# Patient Record
Sex: MALE | Race: WHITE | Employment: FULL TIME | URBAN - METROPOLITAN AREA
[De-identification: names, ages, dates, MRNs, and addresses within clinical notes are randomized per-mention and may not be internally consistent; named-entity substitution may affect disease eponyms.]

---

## 2017-01-22 ENCOUNTER — OFFICE VISIT (OUTPATIENT)
Dept: FAMILY MEDICINE CLINIC | Facility: CLINIC | Age: 22
End: 2017-01-22

## 2017-01-22 DIAGNOSIS — L20.9 MILD ATOPIC DERMATITIS: Primary | ICD-10-CM

## 2017-01-22 PROCEDURE — 99203 OFFICE O/P NEW LOW 30 MIN: CPT | Performed by: NURSE PRACTITIONER

## 2017-01-22 RX ORDER — METHYLPREDNISOLONE 4 MG/1
TABLET ORAL
Qty: 1 KIT | Refills: 0 | Status: SHIPPED | OUTPATIENT
Start: 2017-01-22 | End: 2017-02-13

## 2017-01-22 NOTE — PATIENT INSTRUCTIONS
Atopic Dermatitis (Adult)  Atopic dermatitis is a dry, itchy, red rash. It’s also called eczema. The rash is chronic, or ongoing. It can come and go over time. The disease is often passed down in families.  It causes a problem with the skin barrier that m See your healthcare provider, or as advised. If your symptoms don’t get better or if they get worse in the next 7 days, make an appointment with your healthcare provider.   When to seek medical advice  Call your healthcare provider right away  if any of the

## 2017-01-23 VITALS
HEART RATE: 61 BPM | RESPIRATION RATE: 18 BRPM | SYSTOLIC BLOOD PRESSURE: 118 MMHG | DIASTOLIC BLOOD PRESSURE: 66 MMHG | WEIGHT: 201 LBS | OXYGEN SATURATION: 99 % | TEMPERATURE: 98 F | BODY MASS INDEX: 25 KG/M2

## 2017-01-23 NOTE — PROGRESS NOTES
CHIEF COMPLAINT:   Patient presents with:  Rash: rash on torso         HPI:   Vivi Chatman is a 19year old male who presents for evaluation of a rash. Per patient rash started in the past 3 days. Rash has been worsening since onset.   Patient has LUNGS: Denies shortness of breath with exertion or rest. No cough or wheezing. LYMPH: Denies enlargement of the lymph nodes. NEURO: Denies abnormal sensation, tingling of the skin, or numbness.       EXAM:   /66 mmHg  Pulse 61  Temp(Src) 98 °F (36.7 PLAN: Increase Fluids, Meds as listed below. Comfort measures as described in Patient Instructions. Skin care discussed with patient.      Meds & Refills for this Visit:    Signed Prescriptions Disp Refills    methylPREDNISolone (MEDROL) 4 MG Oral Tablet · Use oral diphenhydramine to help reduce itching. This is an antihistamine you can buy at drug and grocery stores. It can make you sleepy, so use lower doses during the daytime. Or you can use loratadine.  This is an antihistamine that will not make you sl

## 2017-02-02 ENCOUNTER — TELEPHONE (OUTPATIENT)
Dept: DERMATOLOGY CLINIC | Facility: CLINIC | Age: 22
End: 2017-02-02

## 2017-02-02 DIAGNOSIS — L30.9 DERMATITIS: Primary | ICD-10-CM

## 2017-02-02 NOTE — TELEPHONE ENCOUNTER
Pt contacted, states that he was treated by PCP on 1/21/2017 for dermatitis to leg that is not improving. Appt made for pt, referral request put through to Dr. Artem Childs as pt has referral but not for dermatitis.

## 2017-02-13 ENCOUNTER — OFFICE VISIT (OUTPATIENT)
Dept: DERMATOLOGY CLINIC | Facility: CLINIC | Age: 22
End: 2017-02-13

## 2017-02-13 DIAGNOSIS — B00.9 HERPES SIMPLEX VIRUS (HSV) INFECTION: ICD-10-CM

## 2017-02-13 DIAGNOSIS — L30.9 DERMATITIS: Primary | ICD-10-CM

## 2017-02-13 DIAGNOSIS — L50.9 URTICARIA: ICD-10-CM

## 2017-02-13 PROCEDURE — 99212 OFFICE O/P EST SF 10 MIN: CPT | Performed by: DERMATOLOGY

## 2017-02-13 PROCEDURE — 99213 OFFICE O/P EST LOW 20 MIN: CPT | Performed by: DERMATOLOGY

## 2017-02-13 RX ORDER — MONTELUKAST SODIUM 10 MG/1
10 TABLET ORAL NIGHTLY
Qty: 30 TABLET | Refills: 11 | Status: SHIPPED | OUTPATIENT
Start: 2017-02-13 | End: 2017-03-15 | Stop reason: ALTCHOICE

## 2017-02-13 RX ORDER — FAMCICLOVIR 500 MG/1
TABLET, FILM COATED ORAL
Refills: 12 | COMMUNITY
Start: 2017-01-11 | End: 2018-02-06 | Stop reason: ALTCHOICE

## 2017-02-13 RX ORDER — METHYLPREDNISOLONE 4 MG/1
TABLET ORAL
Qty: 1 KIT | Refills: 2 | Status: SHIPPED | OUTPATIENT
Start: 2017-02-13 | End: 2017-03-27 | Stop reason: ALTCHOICE

## 2017-02-15 NOTE — PROGRESS NOTES
Estefania Chang is a 19year old male. Patient presents with:  Rash: LOV 2/5/2016. Pt presenting with rash to bilateral legs x 2 months. Currently using hydrocortisone cream.  Hives: Pt states previously had hives to face and abdomen x 1-2 weeks. External Cream Apply 1 Application topically 2 (two) times daily. Apply every morning and evening.  Disp: 30 g Rfl: 3   acyclovir (ZOVIRAX) 400 MG Oral Tab TAKE 1 TABLET DAILY Disp: 30 tablet Rfl: 0   Adapalene (DIFFERIN) 0.1 % External Gel Use qhs for acne variation with this. Becomes exceedingly uncomfortable. Presently improving. Patient notes that HSV has been recently well controlled on Famvir will continue this  Patient presents with concerns above. Denies any other systemic complaints.   No fevers lesions  RTC as noted needed call with update trial Singulair and topicals    No orders of the defined types were placed in this encounter.        Meds & Refills for this Visit:   Signed Prescriptions Disp Refills    triamcinolone acetonide 0.1 % External C

## 2017-03-15 ENCOUNTER — OFFICE VISIT (OUTPATIENT)
Dept: FAMILY MEDICINE CLINIC | Facility: CLINIC | Age: 22
End: 2017-03-15

## 2017-03-15 VITALS
SYSTOLIC BLOOD PRESSURE: 144 MMHG | BODY MASS INDEX: 26 KG/M2 | DIASTOLIC BLOOD PRESSURE: 64 MMHG | WEIGHT: 208.81 LBS | HEART RATE: 45 BPM

## 2017-03-15 DIAGNOSIS — M25.552 LEFT HIP PAIN: Primary | ICD-10-CM

## 2017-03-15 PROCEDURE — 99212 OFFICE O/P EST SF 10 MIN: CPT | Performed by: FAMILY MEDICINE

## 2017-03-15 PROCEDURE — 99213 OFFICE O/P EST LOW 20 MIN: CPT | Performed by: FAMILY MEDICINE

## 2017-03-15 NOTE — PROGRESS NOTES
Blood pressure 144/64, pulse 45, weight 208 lb 12.8 oz (94.711 kg). Patient presents today complaining of left groin pain that is been bothering him for the past 2 weeks. It initially began when he was bending over to dress.   He reports it was very d

## 2017-03-27 ENCOUNTER — OFFICE VISIT (OUTPATIENT)
Dept: ORTHOPEDICS CLINIC | Facility: CLINIC | Age: 22
End: 2017-03-27

## 2017-03-27 ENCOUNTER — HOSPITAL ENCOUNTER (OUTPATIENT)
Dept: GENERAL RADIOLOGY | Facility: HOSPITAL | Age: 22
Discharge: HOME | End: 2017-03-27
Attending: ORTHOPAEDIC SURGERY
Payer: COMMERCIAL

## 2017-03-27 DIAGNOSIS — M25.552 LEFT HIP PAIN: Primary | ICD-10-CM

## 2017-03-27 DIAGNOSIS — M25.552 LEFT HIP PAIN: ICD-10-CM

## 2017-03-27 PROCEDURE — 73501 X-RAY EXAM HIP UNI 1 VIEW: CPT

## 2017-03-27 PROCEDURE — 99214 OFFICE O/P EST MOD 30 MIN: CPT | Performed by: ORTHOPAEDIC SURGERY

## 2017-03-27 PROCEDURE — 99212 OFFICE O/P EST SF 10 MIN: CPT | Performed by: ORTHOPAEDIC SURGERY

## 2017-03-27 NOTE — PROGRESS NOTES
Is a healthy active 18-year-old male who is describing left hip-groin pain that began 3-4 weeks ago.   Getting up from a flexed position will cause a sudden groin pain that is bothersome with any ambulation and he manipulates his hip he feels a popping sens unremarkable of any obvious hip arthritic change or narrowing I do not see obvious bone lesion    Impression left hip pain -- plan-he has been assessed for an inguinal hernia and stated that he does not have one by his primary care I am concerned about pos

## 2017-03-30 ENCOUNTER — HOSPITAL ENCOUNTER (OUTPATIENT)
Dept: MRI IMAGING | Facility: HOSPITAL | Age: 22
Discharge: HOME | End: 2017-03-30
Attending: ORTHOPAEDIC SURGERY
Payer: COMMERCIAL

## 2017-03-30 DIAGNOSIS — M25.552 LEFT HIP PAIN: ICD-10-CM

## 2017-03-30 PROCEDURE — 73721 MRI JNT OF LWR EXTRE W/O DYE: CPT

## 2017-04-05 ENCOUNTER — OFFICE VISIT (OUTPATIENT)
Dept: ORTHOPEDICS CLINIC | Facility: CLINIC | Age: 22
End: 2017-04-05

## 2017-04-05 DIAGNOSIS — M25.552 LEFT HIP PAIN: Primary | ICD-10-CM

## 2017-04-05 PROCEDURE — 99213 OFFICE O/P EST LOW 20 MIN: CPT | Performed by: ORTHOPAEDIC SURGERY

## 2017-04-05 PROCEDURE — 99212 OFFICE O/P EST SF 10 MIN: CPT | Performed by: ORTHOPAEDIC SURGERY

## 2017-04-05 NOTE — H&P
Chief Complaint: Left hip pain    NURSING INTAKE COMMENTS: Patient presents with:  Hip Pain: Left- pt states pain started 4 wks ago. pt denies any injury. pt saw Dev Yarsanism and he ordered MRI- here to discuss results      History of present illness:   This 21 year Hamstrings 5/5 5/5      Quad 5/5 5/5      Abdominals 5/5 5/5        Pain         Flexion, adduction, IR none none       JANENE none none       Log Roll none none       Palpation of troch bursa none none       Palpation of hip flexors none none        N

## 2017-04-11 RX ORDER — FAMCICLOVIR 500 MG/1
TABLET, FILM COATED ORAL
Qty: 60 TABLET | Refills: 12 | Status: SHIPPED | OUTPATIENT
Start: 2017-04-11 | End: 2018-04-22

## 2017-12-28 ENCOUNTER — OFFICE VISIT (OUTPATIENT)
Dept: FAMILY MEDICINE CLINIC | Facility: CLINIC | Age: 22
End: 2017-12-28

## 2017-12-28 VITALS
RESPIRATION RATE: 12 BRPM | OXYGEN SATURATION: 98 % | SYSTOLIC BLOOD PRESSURE: 118 MMHG | DIASTOLIC BLOOD PRESSURE: 78 MMHG | TEMPERATURE: 98 F | HEART RATE: 52 BPM

## 2017-12-28 DIAGNOSIS — S09.21XA TYMPANIC MEMBRANE RUPTURE, TRAUMATIC, RIGHT, INITIAL ENCOUNTER: Primary | ICD-10-CM

## 2017-12-28 PROCEDURE — 99212 OFFICE O/P EST SF 10 MIN: CPT | Performed by: NURSE PRACTITIONER

## 2017-12-28 NOTE — PATIENT INSTRUCTIONS
Ruptured Eardrum, Traumatic    The eardrum is a thin membrane about one inch from the opening of the ear canal. It is easily injured by objects put into the ear canal. It may also be injured by a loud noise close to the ear or a slap to the ear. A ruptur © 3626-6127 [address]. [facility] [facility] [place]. All rights reserved. This information is not intended as a substitute for professional medical care. Always follow your healthcare professional's instructions.

## 2017-12-28 NOTE — PROGRESS NOTES
CHIEF COMPLAINT:   Patient presents with:  Ear Pain      HPI:   Audrey Romero is a 23year old male who presents to clinic today with complaints of right ear pain.  Patient is a , yesterday he was practicing a maneuver in which he wa EARS: bilateral tragus non tender with manipulation. External auditory canals patent with minimal cerumen. Right TM: with small rupture at base of TM, no bulging, no retraction,no effusion, bony landmarks present, no erythema.   Left TM: intact without usha · Take any prescription or over-the-counter medicines as advised. Follow-up care  Follow up with specialists for test or exams as directed. A hearing test should be done soon after the injury. Also follow up within 2 weeks to check healing of the eardrum.

## 2018-02-03 ENCOUNTER — HOSPITAL ENCOUNTER (OUTPATIENT)
Age: 23
Discharge: HOME | End: 2018-02-03
Attending: FAMILY MEDICINE
Payer: COMMERCIAL

## 2018-02-03 VITALS
DIASTOLIC BLOOD PRESSURE: 73 MMHG | HEIGHT: 75 IN | OXYGEN SATURATION: 100 % | WEIGHT: 205 LBS | BODY MASS INDEX: 25.49 KG/M2 | SYSTOLIC BLOOD PRESSURE: 101 MMHG | HEART RATE: 53 BPM | RESPIRATION RATE: 18 BRPM | TEMPERATURE: 98 F

## 2018-02-03 DIAGNOSIS — K13.0 LIP LESION: Primary | ICD-10-CM

## 2018-02-03 PROCEDURE — 99212 OFFICE O/P EST SF 10 MIN: CPT

## 2018-02-03 PROCEDURE — 99202 OFFICE O/P NEW SF 15 MIN: CPT

## 2018-02-03 NOTE — ED INITIAL ASSESSMENT (HPI)
Patient  Has a canker sore that was there since christmas. No other symptoms. Patient states it is hurting more. Denies any other complaints.

## 2018-02-03 NOTE — ED PROVIDER NOTES
Patient Seen in: [address]    History   Patient presents with:  Canker Sores    Stated Complaint: Canker sore    HPI    Pt is a 22 yo with a 6 week h/o an inner lip lesion. It's not going away. No fevers.  Keeps getting His insurance requires a referral. He has an appt with PCP on Tuesday.         Disposition and Plan     Clinical Impression:  Lip lesion  (primary encounter diagnosis)    Disposition:  Discharge  2/3/2018  5:34 pm    Follow-up:  DO Raysa Shukla S

## 2018-02-06 ENCOUNTER — OFFICE VISIT (OUTPATIENT)
Dept: FAMILY MEDICINE CLINIC | Facility: CLINIC | Age: 23
End: 2018-02-06

## 2018-02-06 VITALS
HEIGHT: 74.5 IN | BODY MASS INDEX: 26.41 KG/M2 | SYSTOLIC BLOOD PRESSURE: 137 MMHG | HEART RATE: 78 BPM | WEIGHT: 208 LBS | DIASTOLIC BLOOD PRESSURE: 68 MMHG

## 2018-02-06 DIAGNOSIS — K13.0 MUCOCELE OF LIP: Primary | ICD-10-CM

## 2018-02-06 PROCEDURE — 99212 OFFICE O/P EST SF 10 MIN: CPT | Performed by: FAMILY MEDICINE

## 2018-02-06 NOTE — PROGRESS NOTES
Blood pressure 137/68, pulse 78, height 6' 2.5\" (1.892 m), weight 208 lb (94.3 kg). Patient presents today following up for a canker sore that has persisted for 7 weeks.     Objective mucocele of lower lip noted    Assessment mucocele    Plan referral t

## 2018-02-13 ENCOUNTER — OFFICE VISIT (OUTPATIENT)
Dept: OTOLARYNGOLOGY | Facility: CLINIC | Age: 23
End: 2018-02-13

## 2018-02-13 VITALS
DIASTOLIC BLOOD PRESSURE: 64 MMHG | SYSTOLIC BLOOD PRESSURE: 127 MMHG | TEMPERATURE: 98 F | WEIGHT: 208 LBS | BODY MASS INDEX: 26.41 KG/M2 | HEIGHT: 74.5 IN

## 2018-02-13 DIAGNOSIS — K13.0 MUCOCELE OF LIP: Primary | ICD-10-CM

## 2018-02-13 PROCEDURE — 99212 OFFICE O/P EST SF 10 MIN: CPT | Performed by: OTOLARYNGOLOGY

## 2018-02-13 PROCEDURE — 99243 OFF/OP CNSLTJ NEW/EST LOW 30: CPT | Performed by: OTOLARYNGOLOGY

## 2018-02-14 NOTE — PROGRESS NOTES
Chalino Dawkins is a 23year old male. Patient presents with:  Mouth/Lip Problem: bump/lesion at his lower lip for 2 months    HPI:   He has had a bump that has been present on his lower lip for the last 2 months.   He does report that he has a sharp t Normal, Turbinates - Normal   Neurological Normal Memory - Normal. Cranial nerves - Cranial nerves II through XII grossly intact.    Neck Exam Normal Inspection - Normal. Palpation - Normal. Parotid gland - Normal. Thyroid gland - Normal.   Psychiatric Norm

## 2018-02-20 ENCOUNTER — OFFICE VISIT (OUTPATIENT)
Dept: OTOLARYNGOLOGY | Facility: CLINIC | Age: 23
End: 2018-02-20

## 2018-02-20 VITALS
HEIGHT: 75 IN | BODY MASS INDEX: 25.49 KG/M2 | TEMPERATURE: 98 F | HEART RATE: 45 BPM | WEIGHT: 205 LBS | DIASTOLIC BLOOD PRESSURE: 63 MMHG | SYSTOLIC BLOOD PRESSURE: 134 MMHG

## 2018-02-20 DIAGNOSIS — K13.0 MUCOCELE OF LOWER LIP: Primary | ICD-10-CM

## 2018-02-20 PROCEDURE — 40812 EXCISE/REPAIR MOUTH LESION: CPT | Performed by: OTOLARYNGOLOGY

## 2018-02-20 RX ORDER — FEXOFENADINE HCL 180 MG/1
180 TABLET ORAL DAILY
COMMUNITY

## 2018-02-22 NOTE — PROGRESS NOTES
Procedure:  After informed consent obtained, topical anesthesia consisting of Hurricaine spray and 1% lidocaine with 1-100,000 epinephrine was infiltrated around the cystic lesion of the lower lip.   An elliptical incision was used to excise the mucosal les

## 2018-04-23 RX ORDER — FAMCICLOVIR 500 MG/1
TABLET, FILM COATED ORAL
Qty: 60 TABLET | Refills: 0 | Status: SHIPPED | OUTPATIENT
Start: 2018-04-23 | End: 2018-05-20

## 2018-05-21 RX ORDER — FAMCICLOVIR 500 MG/1
TABLET, FILM COATED ORAL
Qty: 60 TABLET | Refills: 0 | Status: SHIPPED | OUTPATIENT
Start: 2018-05-21 | End: 2018-08-22

## 2018-08-23 RX ORDER — FAMCICLOVIR 500 MG/1
TABLET, FILM COATED ORAL
Qty: 60 TABLET | Refills: 1 | Status: SHIPPED | OUTPATIENT
Start: 2018-08-23 | End: 2018-10-22

## 2018-10-22 ENCOUNTER — OFFICE VISIT (OUTPATIENT)
Dept: DERMATOLOGY CLINIC | Facility: CLINIC | Age: 23
End: 2018-10-22
Payer: COMMERCIAL

## 2018-10-22 DIAGNOSIS — L30.9 DERMATITIS: ICD-10-CM

## 2018-10-22 DIAGNOSIS — D23.9 BENIGN NEOPLASM OF SKIN, UNSPECIFIED LOCATION: ICD-10-CM

## 2018-10-22 DIAGNOSIS — D22.9 MULTIPLE NEVI: ICD-10-CM

## 2018-10-22 DIAGNOSIS — B00.9 HERPES SIMPLEX VIRUS (HSV) INFECTION: Primary | ICD-10-CM

## 2018-10-22 PROCEDURE — 99213 OFFICE O/P EST LOW 20 MIN: CPT | Performed by: DERMATOLOGY

## 2018-10-22 PROCEDURE — 99212 OFFICE O/P EST SF 10 MIN: CPT | Performed by: DERMATOLOGY

## 2018-10-22 RX ORDER — FAMCICLOVIR 500 MG/1
500 TABLET, FILM COATED ORAL 2 TIMES DAILY
Qty: 60 TABLET | Refills: 12 | Status: SHIPPED | OUTPATIENT
Start: 2018-10-22

## 2018-11-04 NOTE — PROGRESS NOTES
Nyasia Perry is a 20year old male. Patient presents with:  Medication Follow-Up: LOV 2/13/2017. Pt presenting for f/u and medication refill of  FAMCICLOVIR 500. Skin Cancer            Patient has no known allergies.     Current Outpatient Me history.   Social History    Socioeconomic History      Marital status: Single      Spouse name: Not on file      Number of children: Not on file      Years of education: Not on file      Highest education level: Not on file    Social Needs      Financial r Cancer    Patient  is concerned regarding infectious process. No suspicious lesions.      Multiple light to medium brown, well marginated, uniformly pigmented, macules and papules 6 mm and less over the back, chest, abdomen, bilateral arms, abdomen. Minimal erythema.   HSV clear    Exam otherwise significant for no other suspicious lesions      ASSESSMENT AND PLAN:     Herpes simplex virus (hsv) infection  (primary encounter diagnosis)  Dermatitis  Multiple nevi  Benign neoplasm of skin, unsp

## (undated) NOTE — MR AVS SNAPSHOT
[facility] [address]  [address] [place] [address] Road  [phone]               Thank you for choosing us for your health care visit with Governor RAFA Brooke.   We are glad to serve you and happy to provide you with this summary of your to the skin rather than rough or scratchy materials. · Use mild laundry soap free of scents and perfumes. Make sure to rinse all the soap out of your clothes. · Treat any skin infection as directed. · Use oral diphenhydramine to help reduce itching.  Noah Dos Santos These medications were sent to [place] [id number] [facility], [address] [place] [address],[address], [phone], [phone]  [address] [place] [address] Drive, [address] [place] [id number]    Hours:  24-hours Phone:  [phone]    - [id number] 4 M active are less likely to develop some chronic diseases than adults who are inactive.      HOW TO GET STARTED: HOW TO STAY MOTIVATED:   Start activities slowly and build up over time Do what you like   Get your heart pumping – brisk walking, biking, swimmin

## (undated) NOTE — MR AVS SNAPSHOT
[facility]  [address]  [phone]               Thank you for choosing us for your health care visit with Beth Donovan MD.  We are glad to serve you and happy to provide you with this woo

## (undated) NOTE — MR AVS SNAPSHOT
[facility] [address] SPECIALTY [facility] [place] [address] [place]  [id number] [phone]               Thank you for choosing us for your health care visit with Michael Munson MD.  We are glad to serve you and happy to provide you with this summary of - triamcinolone acetonide 0.1 % Crea            MyChart                  Visit [facility] online at  [facility]

## (undated) NOTE — MR AVS SNAPSHOT
[facility]  [address] [place] [address], [address] [place] [address]  [place]               Thank you for choosing us for your health care visit with Deanna Urias. DO Fern.   We are glad to serve you and happy to provide you with this summary reimbursement difficulties for you. Assoc Dx:   Left hip pain [M25.552]          Reason for Today's Visit     Pain           Medical Issues Discussed Today     Left hip pain    -  Primary      Instructions and Information about Your Health     None Visit [facility] online at  [email or web address].tn

## (undated) NOTE — LETTER
Rey Lynch  [address], [address]       02/14/18        Patient: Chalino Dawkins   YOB: 1995   Date of Visit: 2/13/2018       Dear  Dr. Duglas Antoine,      Thank you for referring Chalino

## (undated) NOTE — MR AVS SNAPSHOT
[address] [facility]  [phone]               Thank you for choosing us for your health care visit with Flory Crespo MD.  We are glad to serve you and happy to provide you with this summary have any questions related to insurance coverage. Thank you.          Reason for Today's Visit     Hip Pain           Medical Issues Discussed Today     Left hip pain    -  Primary      Instructions and Information about Your Health     None      Allergies